# Patient Record
Sex: MALE | URBAN - NONMETROPOLITAN AREA
[De-identification: names, ages, dates, MRNs, and addresses within clinical notes are randomized per-mention and may not be internally consistent; named-entity substitution may affect disease eponyms.]

---

## 2022-05-16 ENCOUNTER — OFFICE VISIT (OUTPATIENT)
Dept: SURGERY | Age: 22
End: 2022-05-16
Payer: COMMERCIAL

## 2022-05-16 VITALS
RESPIRATION RATE: 18 BRPM | DIASTOLIC BLOOD PRESSURE: 70 MMHG | OXYGEN SATURATION: 98 % | WEIGHT: 242.7 LBS | HEIGHT: 74 IN | BODY MASS INDEX: 31.15 KG/M2 | HEART RATE: 72 BPM | SYSTOLIC BLOOD PRESSURE: 128 MMHG | TEMPERATURE: 97.5 F

## 2022-05-16 DIAGNOSIS — L05.92 PILONIDAL SINUS WITHOUT ABSCESS: ICD-10-CM

## 2022-05-16 PROBLEM — L05.91 PILONIDAL CYST OF NATAL CLEFT: Status: ACTIVE | Noted: 2022-05-16

## 2022-05-16 PROCEDURE — 99202 OFFICE O/P NEW SF 15 MIN: CPT | Performed by: SURGERY

## 2022-05-16 ASSESSMENT — ENCOUNTER SYMPTOMS
CONSTIPATION: 0
ABDOMINAL PAIN: 0
BLOOD IN STOOL: 0
NAUSEA: 0
SORE THROAT: 0
SHORTNESS OF BREATH: 0
ABDOMINAL DISTENTION: 0
ANAL BLEEDING: 0
RHINORRHEA: 0
COUGH: 0
FACIAL SWELLING: 0
STRIDOR: 0
DIARRHEA: 0
BACK PAIN: 0
APNEA: 0
RECTAL PAIN: 0
PHOTOPHOBIA: 0
EYE REDNESS: 0
ROS SKIN COMMENTS: PILONIDAL CYST
VOICE CHANGE: 0
COLOR CHANGE: 0
SINUS PRESSURE: 0
EYE PAIN: 0
EYE DISCHARGE: 0
TROUBLE SWALLOWING: 0
WHEEZING: 0
CHEST TIGHTNESS: 0
CHOKING: 0
EYE ITCHING: 0
VOMITING: 0

## 2022-05-16 NOTE — PROGRESS NOTES
Ivon Rondon MD [facility]  General Surgery  New Patient Evaluation in Office  Pt Name: Isha Recio  Date of Birth 2000   Today's Date: 5/16/2022  Medical Record Number: [id number]  Referring Provider: No ref. provider found  Primary Care Provider: Ambar Peterson MD  Chief Complaint   Patient presents with   [facility] Consult     New patient-referred by Cate-Pilonidal cyst     ASSESSMENT      Problem List Items Addressed This Visit     Pilonidal sinus without abscess        Past Medical History:   Diagnosis Date    Pilonidal cyst           PLANS      1. Schedule Suleimanpantera Ramírez for nothing at this time  2. He has never had an infection  3. His only complaint was some mild discomfort at the top of his intergluteal cleft to the right-hand side in certain sitting positions which is no longer present for about the past 3 months  4. When he was having that he did not have any redness swelling or mass it was only minor discomfort  Orders Placed This Encounter:  No orders of the defined types were placed in this encounter. Erin Kaur is a 25 y.o. male seen in the office for evaluation of a pilonidal sinus. These likely are asymptomatic. He was having some discomfort at the top of his intergluteal cleft right-hand side a few months ago certain sitting positions. No redness no drainage no signs of infection no mass. He was evaluated by dermatologist who saw him sinus tracts and recommended surgical opinion. He has never had pilonidal cyst infection of any kind he does relate a long these have been there. He has not been doing any local shaving of any kind. Past Medical History  Past Medical History:   Diagnosis Date    Pilonidal cyst        Past Surgical History  History reviewed. No pertinent surgical history.     Medications  Current Outpatient Medications on File Prior to Visit   Medication Sig Dispense Refill    Loratadine (CLARITIN PO) Take by mouth daily       No current facility-administered medications on file prior to visit. Allergies  Allergies   Allergen Reactions    Bee Pollen      Itching runny nose       Family History  Family History   Problem Relation Age of Onset    Cancer Mother         breast    No Known Problems Father     No Known Problems Brother     No Known Problems Brother     No Known Problems Brother     No Known Problems Brother     No Known Problems Maternal Grandmother     No Known Problems Maternal Grandfather     No Known Problems Paternal Grandmother     No Known Problems Paternal Grandfather        Social History  Social History     Socioeconomic History    Marital status: Single     Spouse name: Not on file    Number of children: Not on file    Years of education: Not on file    Highest education level: Not on file   Occupational History    Not on file   Tobacco Use    Smoking status: Never Smoker    Smokeless tobacco: Never Used   Substance and Sexual Activity    Alcohol use: Not Currently    Drug use: Not Currently    Sexual activity: Not on file   Other Topics Concern    Not on file   Social History Narrative    Not on file     Social Determinants of Health     Financial Resource Strain:     Difficulty of Paying Living Expenses: Not on file   Food Insecurity:     Worried About Running Out of Food in the Last Year: Not on file    [place] J of Food in the Last Year: Not on file   Transportation Needs:     Lack of Transportation (Medical): Not on file    Lack of Transportation (Non-Medical):  Not on file   Physical Activity:     Days of Exercise per Week: Not on file    Minutes of Exercise per Session: Not on file   Stress:     Feeling of Stress : Not on file   Social Connections:     Frequency of Communication with Friends and Family: Not on file    Frequency of Social Gatherings with Friends and Family: Not on file    Attends Church Services: Not on file    Active Member of Clubs or Organizations: Not on file   Clayton Santos Attends Club or Organization Meetings: Not on file    Marital Status: Not on file   Intimate Partner Violence:     Fear of Current or Ex-Partner: Not on file    Emotionally Abused: Not on file    Physically Abused: Not on file    Sexually Abused: Not on file   Housing Stability:     Unable to Pay for Housing in the Last Year: Not on file    Number of [place] in the Last Year: Not on file    Unstable Housing in the Last Year: Not on [address] Maintenance   Topic Date Due    COVID-19 Vaccine (1) Never done    HPV vaccine (1 - Male 2-dose series) Never done    Depression Screen  Never done    HIV screen  Never done    Hepatitis C screen  Never done    Flu vaccine (Season Ended) 09/01/2022    DTaP/Tdap/Td vaccine (7 - Td or Tdap) 08/12/2024    Hepatitis A vaccine  Completed    Hepatitis B vaccine  Completed    Hib vaccine  Completed    Varicella vaccine  Completed    Meningococcal (ACWY) vaccine  Completed    Pneumococcal 0-64 years Vaccine  Aged Out       Review of Systems  Constitutional: Negative for activity change, appetite change, chills, diaphoresis, fatigue, fever and unexpected weight change. HENT: Negative for congestion, dental problem, drooling, ear discharge, ear pain, facial swelling, hearing loss, mouth sores, nosebleeds, postnasal drip, rhinorrhea, sinus pressure, sneezing, sore throat, tinnitus, trouble swallowing and voice change. Eyes: Negative for photophobia, pain, discharge, redness, itching and visual disturbance. Respiratory: Negative for apnea, cough, choking, chest tightness, shortness of breath, wheezing and stridor. Cardiovascular: Negative for chest pain, palpitations and leg swelling. Gastrointestinal: Negative for abdominal distention, abdominal pain, anal bleeding, blood in stool, constipation, diarrhea, nausea, rectal pain and vomiting.    Endocrine: Negative for cold intolerance, heat intolerance, polydipsia, polyphagia and polyuria. Genitourinary: Negative for decreased urine volume, difficulty urinating, dysuria, enuresis, flank pain, frequency, genital sores, hematuria and urgency. Musculoskeletal: Negative for arthralgias, back pain, gait problem, joint swelling, myalgias, neck pain and neck stiffness. Skin: Negative for color change, pallor, rash and wound. Pilonidal cyst   Allergic/Immunologic: Negative for environmental allergies, food allergies and immunocompromised state. Neurological: Negative for dizziness, tremors, seizures, syncope, facial asymmetry, speech difficulty, weakness, light-headedness, numbness and headaches. Hematological: Negative for adenopathy. Does not bruise/bleed easily. Psychiatric/Behavioral: Negative for agitation, behavioral problems, confusion, decreased concentration, dysphoric mood, hallucinations, self-injury, sleep disturbance and suicidal ideas. The patient is not nervous/anxious and is not hyperactive      OBJECTIVE    VITALS:  height is 6' 2\" (1.88 m) and weight is 242 lb 11.2 oz (110.1 kg). His temporal temperature is 97.5 °F (36.4 °C). His blood pressure is 128/70 and his pulse is 72. His respiration is 18 and oxygen saturation is 98%. CONSTITUTIONAL: Alert and oriented times 3, no acute distress and cooperative to examination with proper mood and affect. SKIN: Skin color, texture, turgor normal. No rashes or lesions.     RECTAL: pilonidal sinus tracts 3 without any evidence of pilonidal abscess or's pilonidal cyst                 Electronically signed by Marly Tabares MD on 5/16/2022 at 2:25 PM

## 2022-05-16 NOTE — PROGRESS NOTES
Subjective:      Patient ID: Isha Recio is a 25 y.o. male. Chief Complaint   Patient presents with    Surgical Consult     New patient-referred by Cate-Pilonidal cyst       HPI    Review of Systems   Constitutional: Negative for activity change, appetite change, chills, diaphoresis, fatigue, fever and unexpected weight change. HENT: Negative for congestion, dental problem, drooling, ear discharge, ear pain, facial swelling, hearing loss, mouth sores, nosebleeds, postnasal drip, rhinorrhea, sinus pressure, sneezing, sore throat, tinnitus, trouble swallowing and voice change. Eyes: Negative for photophobia, pain, discharge, redness, itching and visual disturbance. Respiratory: Negative for apnea, cough, choking, chest tightness, shortness of breath, wheezing and stridor. Cardiovascular: Negative for chest pain, palpitations and leg swelling. Gastrointestinal: Negative for abdominal distention, abdominal pain, anal bleeding, blood in stool, constipation, diarrhea, nausea, rectal pain and vomiting. Endocrine: Negative for cold intolerance, heat intolerance, polydipsia, polyphagia and polyuria. Genitourinary: Negative for decreased urine volume, difficulty urinating, dysuria, enuresis, flank pain, frequency, genital sores, hematuria and urgency. Musculoskeletal: Negative for arthralgias, back pain, gait problem, joint swelling, myalgias, neck pain and neck stiffness. Skin: Negative for color change, pallor, rash and wound. Pilonidal cyst   Allergic/Immunologic: Negative for environmental allergies, food allergies and immunocompromised state. Neurological: Negative for dizziness, tremors, seizures, syncope, facial asymmetry, speech difficulty, weakness, light-headedness, numbness and headaches. Hematological: Negative for adenopathy. Does not bruise/bleed easily.    Psychiatric/Behavioral: Negative for agitation, behavioral problems, confusion, decreased concentration, dysphoric mood, hallucinations, self-injury, sleep disturbance and suicidal ideas. The patient is not nervous/anxious and is not hyperactive.       /70 (Site: Right Upper Arm, Position: Sitting, Cuff Size: Medium Adult)   Pulse 72   Temp 97.5 °F (36.4 °C) (Temporal)   Resp 18   Ht 6' 2\" (1.88 m)   Wt 242 lb 11.2 oz (110.1 kg)   SpO2 98%   BMI 31.16 kg/m²     Objective:   Physical Exam    Assessment:            Plan:              Zayra Dixon LPN